# Patient Record
Sex: MALE | Race: ASIAN | NOT HISPANIC OR LATINO | URBAN - METROPOLITAN AREA
[De-identification: names, ages, dates, MRNs, and addresses within clinical notes are randomized per-mention and may not be internally consistent; named-entity substitution may affect disease eponyms.]

---

## 2020-07-07 ENCOUNTER — EMERGENCY (EMERGENCY)
Facility: HOSPITAL | Age: 50
LOS: 1 days | Discharge: HOME | End: 2020-07-07
Attending: STUDENT IN AN ORGANIZED HEALTH CARE EDUCATION/TRAINING PROGRAM
Payer: MEDICAID

## 2020-07-07 VITALS
HEART RATE: 102 BPM | WEIGHT: 169.98 LBS | RESPIRATION RATE: 17 BRPM | DIASTOLIC BLOOD PRESSURE: 87 MMHG | TEMPERATURE: 99 F | SYSTOLIC BLOOD PRESSURE: 134 MMHG | HEIGHT: 69 IN

## 2020-07-07 PROCEDURE — 99285 EMERGENCY DEPT VISIT HI MDM: CPT

## 2020-07-07 PROCEDURE — 93010 ELECTROCARDIOGRAM REPORT: CPT

## 2020-07-07 NOTE — ED ADULT TRIAGE NOTE - CHIEF COMPLAINT QUOTE
Patient c/o generalized abdominal pain since yesterday accompanied  nausea/vomiting/bloated. Patient had fever yesterday. History of Diabetes II and HTN.

## 2020-07-08 VITALS
TEMPERATURE: 99 F | RESPIRATION RATE: 19 BRPM | DIASTOLIC BLOOD PRESSURE: 97 MMHG | SYSTOLIC BLOOD PRESSURE: 141 MMHG | OXYGEN SATURATION: 99 % | HEART RATE: 98 BPM

## 2020-07-08 LAB
ALBUMIN SERPL ELPH-MCNC: 4.3 G/DL — SIGNIFICANT CHANGE UP (ref 3.3–5)
ALP SERPL-CCNC: 67 U/L — SIGNIFICANT CHANGE UP (ref 40–120)
ALT FLD-CCNC: 21 U/L — SIGNIFICANT CHANGE UP (ref 10–45)
ANION GAP SERPL CALC-SCNC: 16 MMOL/L — SIGNIFICANT CHANGE UP (ref 5–17)
APPEARANCE UR: CLEAR — SIGNIFICANT CHANGE UP
AST SERPL-CCNC: 34 U/L — SIGNIFICANT CHANGE UP (ref 10–40)
BACTERIA # UR AUTO: NEGATIVE — SIGNIFICANT CHANGE UP
BASE EXCESS BLDV CALC-SCNC: -0.2 MMOL/L — SIGNIFICANT CHANGE UP (ref -2–2)
BASOPHILS # BLD AUTO: 0.03 K/UL — SIGNIFICANT CHANGE UP (ref 0–0.2)
BASOPHILS NFR BLD AUTO: 0.4 % — SIGNIFICANT CHANGE UP (ref 0–2)
BILIRUB SERPL-MCNC: 0.3 MG/DL — SIGNIFICANT CHANGE UP (ref 0.2–1.2)
BILIRUB UR-MCNC: NEGATIVE — SIGNIFICANT CHANGE UP
BUN SERPL-MCNC: 17 MG/DL — SIGNIFICANT CHANGE UP (ref 7–23)
CA-I SERPL-SCNC: 1.15 MMOL/L — SIGNIFICANT CHANGE UP (ref 1.12–1.3)
CALCIUM SERPL-MCNC: 8.9 MG/DL — SIGNIFICANT CHANGE UP (ref 8.4–10.5)
CHLORIDE BLDV-SCNC: 105 MMOL/L — SIGNIFICANT CHANGE UP (ref 96–108)
CHLORIDE SERPL-SCNC: 101 MMOL/L — SIGNIFICANT CHANGE UP (ref 96–108)
CO2 BLDV-SCNC: 27 MMOL/L — SIGNIFICANT CHANGE UP (ref 22–30)
CO2 SERPL-SCNC: 21 MMOL/L — LOW (ref 22–31)
COLOR SPEC: SIGNIFICANT CHANGE UP
CREAT SERPL-MCNC: 0.68 MG/DL — SIGNIFICANT CHANGE UP (ref 0.5–1.3)
DIFF PNL FLD: NEGATIVE — SIGNIFICANT CHANGE UP
EOSINOPHIL # BLD AUTO: 0.1 K/UL — SIGNIFICANT CHANGE UP (ref 0–0.5)
EOSINOPHIL NFR BLD AUTO: 1.5 % — SIGNIFICANT CHANGE UP (ref 0–6)
EPI CELLS # UR: 0 /HPF — SIGNIFICANT CHANGE UP
GAS PNL BLDV: 136 MMOL/L — SIGNIFICANT CHANGE UP (ref 135–145)
GAS PNL BLDV: SIGNIFICANT CHANGE UP
GAS PNL BLDV: SIGNIFICANT CHANGE UP
GLUCOSE BLDV-MCNC: 116 MG/DL — HIGH (ref 70–99)
GLUCOSE SERPL-MCNC: 119 MG/DL — HIGH (ref 70–99)
GLUCOSE UR QL: ABNORMAL
HCO3 BLDV-SCNC: 25 MMOL/L — SIGNIFICANT CHANGE UP (ref 21–29)
HCT VFR BLD CALC: 51.4 % — HIGH (ref 39–50)
HCT VFR BLDA CALC: 53 % — HIGH (ref 39–50)
HGB BLD CALC-MCNC: 17.2 G/DL — HIGH (ref 13–17)
HGB BLD-MCNC: 16.3 G/DL — SIGNIFICANT CHANGE UP (ref 13–17)
HOROWITZ INDEX BLDV+IHG-RTO: SIGNIFICANT CHANGE UP
IMM GRANULOCYTES NFR BLD AUTO: 0.3 % — SIGNIFICANT CHANGE UP (ref 0–1.5)
KETONES UR-MCNC: ABNORMAL
LACTATE BLDV-MCNC: 2.1 MMOL/L — HIGH (ref 0.7–2)
LEUKOCYTE ESTERASE UR-ACNC: NEGATIVE — SIGNIFICANT CHANGE UP
LIDOCAIN IGE QN: 35 U/L — SIGNIFICANT CHANGE UP (ref 7–60)
LYMPHOCYTES # BLD AUTO: 1.26 K/UL — SIGNIFICANT CHANGE UP (ref 1–3.3)
LYMPHOCYTES # BLD AUTO: 18.3 % — SIGNIFICANT CHANGE UP (ref 13–44)
MCHC RBC-ENTMCNC: 27.4 PG — SIGNIFICANT CHANGE UP (ref 27–34)
MCHC RBC-ENTMCNC: 31.7 GM/DL — LOW (ref 32–36)
MCV RBC AUTO: 86.5 FL — SIGNIFICANT CHANGE UP (ref 80–100)
MONOCYTES # BLD AUTO: 0.54 K/UL — SIGNIFICANT CHANGE UP (ref 0–0.9)
MONOCYTES NFR BLD AUTO: 7.8 % — SIGNIFICANT CHANGE UP (ref 2–14)
NEUTROPHILS # BLD AUTO: 4.94 K/UL — SIGNIFICANT CHANGE UP (ref 1.8–7.4)
NEUTROPHILS NFR BLD AUTO: 71.7 % — SIGNIFICANT CHANGE UP (ref 43–77)
NITRITE UR-MCNC: NEGATIVE — SIGNIFICANT CHANGE UP
NRBC # BLD: 0 /100 WBCS — SIGNIFICANT CHANGE UP (ref 0–0)
PCO2 BLDV: 47 MMHG — SIGNIFICANT CHANGE UP (ref 35–50)
PH BLDV: 7.36 — SIGNIFICANT CHANGE UP (ref 7.35–7.45)
PH UR: 6 — SIGNIFICANT CHANGE UP (ref 5–8)
PLATELET # BLD AUTO: 208 K/UL — SIGNIFICANT CHANGE UP (ref 150–400)
PO2 BLDV: 41 MMHG — SIGNIFICANT CHANGE UP (ref 25–45)
POTASSIUM BLDV-SCNC: 3.7 MMOL/L — SIGNIFICANT CHANGE UP (ref 3.5–5.3)
POTASSIUM SERPL-MCNC: 4.2 MMOL/L — SIGNIFICANT CHANGE UP (ref 3.5–5.3)
POTASSIUM SERPL-SCNC: 4.2 MMOL/L — SIGNIFICANT CHANGE UP (ref 3.5–5.3)
PROT SERPL-MCNC: 6.9 G/DL — SIGNIFICANT CHANGE UP (ref 6–8.3)
PROT UR-MCNC: NEGATIVE — SIGNIFICANT CHANGE UP
RBC # BLD: 5.94 M/UL — HIGH (ref 4.2–5.8)
RBC # FLD: 13.3 % — SIGNIFICANT CHANGE UP (ref 10.3–14.5)
RBC CASTS # UR COMP ASSIST: 1 /HPF — SIGNIFICANT CHANGE UP (ref 0–4)
SAO2 % BLDV: 73 % — SIGNIFICANT CHANGE UP (ref 67–88)
SODIUM SERPL-SCNC: 138 MMOL/L — SIGNIFICANT CHANGE UP (ref 135–145)
SP GR SPEC: 1.03 — HIGH (ref 1.01–1.02)
UROBILINOGEN FLD QL: NEGATIVE — SIGNIFICANT CHANGE UP
WBC # BLD: 6.89 K/UL — SIGNIFICANT CHANGE UP (ref 3.8–10.5)
WBC # FLD AUTO: 6.89 K/UL — SIGNIFICANT CHANGE UP (ref 3.8–10.5)
WBC UR QL: 1 /HPF — SIGNIFICANT CHANGE UP (ref 0–5)

## 2020-07-08 PROCEDURE — 74177 CT ABD & PELVIS W/CONTRAST: CPT

## 2020-07-08 PROCEDURE — 82947 ASSAY GLUCOSE BLOOD QUANT: CPT

## 2020-07-08 PROCEDURE — 83690 ASSAY OF LIPASE: CPT

## 2020-07-08 PROCEDURE — 84295 ASSAY OF SERUM SODIUM: CPT

## 2020-07-08 PROCEDURE — 83605 ASSAY OF LACTIC ACID: CPT

## 2020-07-08 PROCEDURE — 99284 EMERGENCY DEPT VISIT MOD MDM: CPT | Mod: 25

## 2020-07-08 PROCEDURE — 81001 URINALYSIS AUTO W/SCOPE: CPT

## 2020-07-08 PROCEDURE — 87086 URINE CULTURE/COLONY COUNT: CPT

## 2020-07-08 PROCEDURE — 96374 THER/PROPH/DIAG INJ IV PUSH: CPT | Mod: XU

## 2020-07-08 PROCEDURE — 84132 ASSAY OF SERUM POTASSIUM: CPT

## 2020-07-08 PROCEDURE — 71045 X-RAY EXAM CHEST 1 VIEW: CPT

## 2020-07-08 PROCEDURE — 82330 ASSAY OF CALCIUM: CPT

## 2020-07-08 PROCEDURE — 74177 CT ABD & PELVIS W/CONTRAST: CPT | Mod: 26

## 2020-07-08 PROCEDURE — 80053 COMPREHEN METABOLIC PANEL: CPT

## 2020-07-08 PROCEDURE — 82803 BLOOD GASES ANY COMBINATION: CPT

## 2020-07-08 PROCEDURE — 85014 HEMATOCRIT: CPT

## 2020-07-08 PROCEDURE — 71045 X-RAY EXAM CHEST 1 VIEW: CPT | Mod: 26

## 2020-07-08 PROCEDURE — 96375 TX/PRO/DX INJ NEW DRUG ADDON: CPT

## 2020-07-08 PROCEDURE — 93005 ELECTROCARDIOGRAM TRACING: CPT

## 2020-07-08 PROCEDURE — 85027 COMPLETE CBC AUTOMATED: CPT

## 2020-07-08 PROCEDURE — 82435 ASSAY OF BLOOD CHLORIDE: CPT

## 2020-07-08 RX ORDER — SODIUM CHLORIDE 9 MG/ML
1000 INJECTION INTRAMUSCULAR; INTRAVENOUS; SUBCUTANEOUS ONCE
Refills: 0 | Status: COMPLETED | OUTPATIENT
Start: 2020-07-08 | End: 2020-07-08

## 2020-07-08 RX ORDER — ONDANSETRON 8 MG/1
4 TABLET, FILM COATED ORAL ONCE
Refills: 0 | Status: COMPLETED | OUTPATIENT
Start: 2020-07-08 | End: 2020-07-08

## 2020-07-08 RX ORDER — FAMOTIDINE 10 MG/ML
20 INJECTION INTRAVENOUS ONCE
Refills: 0 | Status: COMPLETED | OUTPATIENT
Start: 2020-07-08 | End: 2020-07-08

## 2020-07-08 RX ADMIN — ONDANSETRON 4 MILLIGRAM(S): 8 TABLET, FILM COATED ORAL at 03:17

## 2020-07-08 RX ADMIN — FAMOTIDINE 20 MILLIGRAM(S): 10 INJECTION INTRAVENOUS at 01:07

## 2020-07-08 RX ADMIN — SODIUM CHLORIDE 1000 MILLILITER(S): 9 INJECTION INTRAMUSCULAR; INTRAVENOUS; SUBCUTANEOUS at 00:35

## 2020-07-08 RX ADMIN — SODIUM CHLORIDE 1000 MILLILITER(S): 9 INJECTION INTRAMUSCULAR; INTRAVENOUS; SUBCUTANEOUS at 00:57

## 2020-07-08 RX ADMIN — SODIUM CHLORIDE 1000 MILLILITER(S): 9 INJECTION INTRAMUSCULAR; INTRAVENOUS; SUBCUTANEOUS at 03:17

## 2020-07-08 RX ADMIN — Medication 30 MILLILITER(S): at 01:07

## 2020-07-08 NOTE — ED PROVIDER NOTE - PATIENT PORTAL LINK FT
You can access the FollowMyHealth Patient Portal offered by [facility] by registering at the following website: [email or web address] By joining [email or web address]’s FollowMyHealth portal, you will also be able to view your health information using other applications (apps) compatible with our system.

## 2020-07-08 NOTE — ED PROVIDER NOTE - NSFOLLOWUPCLINICS_GEN_ALL_ED_FT
[facility]  Gastroenterology  [address] [place] [id number]  Phone: [phone]  Fax:   Follow Up Time: 1-3 Days

## 2020-07-08 NOTE — ED ADULT NURSE NOTE - OBJECTIVE STATEMENT
51y/o male history of HTN, DM presents to the ED via  home c/o Fever, abdominal pain/discomfort (3/10 sharp pain) that radiates from mid epigastric to the LUQ for 2 days. Pt reports vomiting the last 2 days after eating in the morning, also stated he had a fever of 102 and took tylenol twice yesterday. upon arrival oral temperature was 99.5 Farenheit. Pt is AOx4, patent airway, clear lung sounds, bowel sounds active in all four quadrants, tender upon palpation to the epigastric region.  x . Patient denies chills, weakness, diarrhea/constipation, numbness/tingling, urinary s/s, in no respiratory distress, no chest pain, Patient safety provided with call bell within reach and bed in the lowest position. 18G Iv in Right AC labs drawn and sent as ordered. 49y/o male history of HTN, DM presents to the ED via  home c/o Fever, abdominal pain/discomfort (3/10 sharp pain) that radiates from mid epigastric to the LUQ for 2 days. Pt reports vomiting the last 2 days after eating in the morning, also stated he had a fever of 102 and took tylenol twice yesterday. upon arrival oral temperature was 99.5 Farenheit. Pt is AOx4, patent airway, clear lung sounds, bowel sounds active in all four quadrants, tender upon palpation to the epigastric region. Patient denies chills, weakness, diarrhea/constipation, numbness/tingling, urinary s/s, in no respiratory distress, no chest pain, Patient safety provided with call bell within reach and bed in the lowest position. 18G Iv in Right AC labs drawn and sent as ordered.

## 2020-07-08 NOTE — ED PROVIDER NOTE - PROGRESS NOTE DETAILS
Pt reports minimal improvement in abdominal discomfort and fullness. Plan for CT a/p. - Raisa Fish PA-C Pt reports mild improvement in pain. No n/v. Tolerating PO fluid and crackers in ED. CT results d/w patient and advised f/u with PCP and GI. Stable for d/c. - Raisa Fish PA-C

## 2020-07-08 NOTE — ED ADULT NURSE REASSESSMENT NOTE - NS ED NURSE REASSESS COMMENT FT1
pt given anti nausea medication, IV fluid infusing. Pt given Oral contrast given to pt. approx item for CT is 430. pt educated on drinking oral contrast. no distress noted at this time. pt given anti nausea medication, IV fluid infusing. Pt given Oral contrast. approx item for CT is 430. pt educated on drinking oral contrast. no distress noted at this time.

## 2020-07-08 NOTE — ED PROVIDER NOTE - NSFOLLOWUPINSTRUCTIONS_ED_ALL_ED_FT
Stay well hydrated. Eat a bland diet. Avoid fatty/fried/spicy/acidic foods.     Take Zofran 4mg every 8 hours as needed for nausea.     You may take Pepcid 20mg 30 minutes before meals 2x/day and over the counter Maalox as needed.     Follow up with your primary care provider upon discharge.    Follow up with Gastroenterology upon discharge for further evaluation and outpatient endoscopy. Bring copy of your printed results with you.   ***Be sure to follow up adrenal gland nodule as discussed, nonemergent abdominal MRI is recommended.     Return to ER for  new/worsening abdominal pain, vomiting, persistent or bloody diarrhea, inability to tolerate food/fluid, chest pain, shortness of breath, or any other concerning symptoms. Stay well hydrated. Eat a bland diet. Avoid fatty/fried/spicy/acidic foods.     Take Zofran 4mg every 8 hours as needed for nausea.     You may take Pepcid 20mg 30 minutes before meals 2x/day and over the counter Maalox as needed.     Follow up with your primary care provider upon discharge.    Follow up with Gastroenterology upon discharge for further evaluation and outpatient endoscopy. Bring copy of your printed results with you.     ***Be sure to follow up adrenal gland nodule as discussed; nonemergent abdominal MRI is recommended.     Return to ER for any new/worsening abdominal pain, vomiting, persistent or bloody diarrhea, inability to tolerate food/fluid, chest pain, shortness of breath, or any other concerning symptoms.

## 2020-07-08 NOTE — ED PROVIDER NOTE - CLINICAL SUMMARY MEDICAL DECISION MAKING FREE TEXT BOX
51yo M no PMH p/w abdominal discomfort and n/v x 2 days.  Unable to tolerate PO x 2 days.  normal BM at start of symptoms and patient is passing gas.  Pt in NAD but tachycardic.  Mild epigastric ttp on exam, otherwise normal physical exam.  Possible gastritis, pancreatitis, gall bladder pathology, less likley obstruction.  Will check labs, ua, give analgesics and reassess need for CT or other abd imaging.  - Kelly Santiago, DO

## 2020-07-08 NOTE — ED PROVIDER NOTE - OBJECTIVE STATEMENT
49yo M with no significant PMH presenting with abdominal discomfort and n/v x 2 days. Pt reports abdominal fullness/discomfort/bloating started after eating a small breakfast on 7/6. Proceeded to have 2 episodes of nb/nb vomiting and also noted fever tmax 101F same day. On 7/7 was unable to tolerate PO with episodes of vomiting after eating, even with small amounts of rice. No fevers today. Last BM on 7/6 and normal, + flatulence since. ?mild dysuria 2 days ago. Denies any CP, SOB, diarrhea, melena, hematuria, h/o abd surgeries. 49yo M with no significant PMH presenting with abdominal discomfort and n/v x 2 days. Pt reports abdominal fullness/discomfort/bloating started after eating a small breakfast on 7/6. Proceeded to have 2 episodes of nb/nb vomiting and also noted fever tmax 101F same day. On 7/7 was unable to tolerate PO with episodes of vomiting after eating, even with small amounts of rice. No fevers today. Last BM on 7/6 and normal, + flatulence since. ?mild dysuria 2 days ago. Denies any CP, SOB, diarrhea, melena, hematuria, h/o abd surgeries. No previous endoscopy/colonoscopy.

## 2020-07-09 LAB
CULTURE RESULTS: NO GROWTH — SIGNIFICANT CHANGE UP
SPECIMEN SOURCE: SIGNIFICANT CHANGE UP

## 2022-09-25 NOTE — ED PROVIDER NOTE - EYES, MLM
[facility] Progress Note    Admitting Date and Time: 9/22/2022  8:50 PM  Admit Dx: Atrial fibrillation with rapid ventricular response ([address] [I48.91]  COVID-19 [U07.1]    Subjective:  Patient is being followed for Atrial fibrillation with rapid ventricular response ([address] [I48.91]  COVID-19 [U07.1]   Pt feels so-so. Eating okay. Per RN:  coughing. Ordered labs. O2 sat 97% on RA    ROS: denies fever, chills, cp, sob, n/v, HA unless stated above.       zinc sulfate  50 mg Oral Daily    ipratropium-albuterol  1 ampule Inhalation Q4H WA    sodium chloride flush  5-40 mL IntraVENous 2 times per day    ascorbic acid  500 mg Oral Daily    vitamin D  2,000 Units Oral Daily    apixaban  2.5 mg Oral BID    metoprolol succinate  25 mg Oral BID     ondansetron, 4 mg, Q8H PRN   Or  ondansetron, 4 mg, Q6H PRN  polyethylene glycol, 17 g, Daily PRN  acetaminophen, 650 mg, Q6H PRN   Or  acetaminophen, 650 mg, Q6H PRN  sodium chloride flush, 5-40 mL, PRN  sodium chloride, , PRN  perflutren lipid microspheres, 1.5 mL, ONCE PRN  benzonatate, 100 mg, TID PRN  guaiFENesin-dextromethorphan, 5 mL, Q4H PRN    Objective:    BP (!) 128/100   Pulse 90   Temp 97.7 °F (36.5 °C) (Oral)   Resp 20   Ht 4' 10\" (1.473 m)   Wt 101 lb 11.2 oz (46.1 kg)   SpO2 97%   BMI 21.26 kg/m²     General Appearance: alert and oriented to person, place and time and in mild acute distress from coughing  Skin: warm and dry  Head: normocephalic and atraumatic  Eyes: pupils equal, round, and reactive to light, extraocular eye movements intact, conjunctivae normal  Neck: neck supple and non tender without mass   Pulmonary/Chest: clear to auscultation bilaterally- no wheezes, rales or rhonchi, normal air movement, no respiratory distress  Cardiovascular: normal rate, normal S1 and S2 and no carotid bruits  Abdomen: soft, non-tender, non-distended, normal bowel sounds, no masses or organomegaly  Extremities: no cyanosis, no clubbing and no Clear bilaterally. edema  Neurologic: no cranial nerve deficit and speech normal    Recent Labs     09/22/22 2056 09/23/22  0915    137   K 3.9 3.6   CL 98 102   CO2 22 23   BUN 14 16   CREATININE 0.9 1.0   GLUCOSE 121* 126*   CALCIUM 9.4 9.4       Recent Labs     09/22/22 2056 09/24/22  0305   WBC 6.9 4.4*   RBC 5.15 4.52   HGB 12.2 10.7*   HCT 38.5 34.4   MCV 74.8* 76.1*   MCH 23.7* 23.7*   MCHC 31.7* 31.1*   RDW 16.5* 16.5*    156   MPV 10.5 10.9     Radiology: No results found. Assessment:    Principal Problem:    COVID-19  Active Problems:    Fatigue    Atrial fibrillation with rapid ventricular response (HCC)  Resolved Problems:    * No resolved hospital problems. *      Plan:  1. COVID-19 infection - supportive care. Vitamin cocktail. Still SOB today. Coughing more. Tessalon perles increased to 200 mg tid. Duonebs ordered. IS. Monitor O2 sats  2. First-degree AV block/PACs -  Cardiology consulted. Supportive care recommended. 3.  Fatigue -may be due to COVID-19. Supportive care. PT/OT evaluated yesterday - PT 17/24; OT 23/24. Decision pending on NAYELY vs.  HH. Total care time:  18 minutes    NOTE: This report was transcribed using voice recognition software. Every effort was made to ensure accuracy; however, inadvertent computerized transcription errors may be present.     Electronically signed by Danielle Campo MD on 9/25/2022 at 9:47 AM